# Patient Record
Sex: FEMALE | Race: WHITE | NOT HISPANIC OR LATINO | Employment: FULL TIME | URBAN - METROPOLITAN AREA
[De-identification: names, ages, dates, MRNs, and addresses within clinical notes are randomized per-mention and may not be internally consistent; named-entity substitution may affect disease eponyms.]

---
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
## 2022-03-30 ENCOUNTER — HISTORICAL (OUTPATIENT)
Dept: ADMINISTRATIVE | Facility: HOSPITAL | Age: 22
End: 2022-03-30

## 2022-03-30 ENCOUNTER — HISTORICAL (OUTPATIENT)
Dept: RADIOLOGY | Facility: HOSPITAL | Age: 22
End: 2022-03-30

## 2022-09-30 DIAGNOSIS — M79.641 RIGHT HAND PAIN: Primary | ICD-10-CM

## 2022-10-10 ENCOUNTER — OFFICE VISIT (OUTPATIENT)
Dept: ORTHOPEDICS | Facility: CLINIC | Age: 22
End: 2022-10-10
Payer: MEDICAID

## 2022-10-10 ENCOUNTER — HOSPITAL ENCOUNTER (OUTPATIENT)
Dept: RADIOLOGY | Facility: HOSPITAL | Age: 22
Discharge: HOME | End: 2022-10-10
Attending: STUDENT IN AN ORGANIZED HEALTH CARE EDUCATION/TRAINING PROGRAM
Payer: MEDICAID

## 2022-10-10 VITALS
SYSTOLIC BLOOD PRESSURE: 103 MMHG | HEART RATE: 85 BPM | WEIGHT: 95 LBS | RESPIRATION RATE: 16 BRPM | BODY MASS INDEX: 15.83 KG/M2 | HEIGHT: 65 IN | DIASTOLIC BLOOD PRESSURE: 67 MMHG

## 2022-10-10 DIAGNOSIS — M79.641 RIGHT HAND PAIN: ICD-10-CM

## 2022-10-10 DIAGNOSIS — S62.308A CLOSED DISPLACED FRACTURE OF FIFTH METACARPAL BONE, UNSPECIFIED FRACTURE MORPHOLOGY, INITIAL ENCOUNTER: Primary | ICD-10-CM

## 2022-10-10 PROCEDURE — 26600 TREAT METACARPAL FRACTURE: CPT | Mod: PBBFAC | Performed by: STUDENT IN AN ORGANIZED HEALTH CARE EDUCATION/TRAINING PROGRAM

## 2022-10-10 PROCEDURE — 73130 X-RAY EXAM OF HAND: CPT | Mod: TC,RT

## 2022-10-10 PROCEDURE — 99213 OFFICE O/P EST LOW 20 MIN: CPT | Mod: PBBFAC

## 2022-10-10 RX ORDER — NORETHINDRONE ACETATE AND ETHINYL ESTRADIOL AND FERROUS FUMARATE 1MG-20(24)
1 KIT ORAL DAILY
COMMUNITY
Start: 2022-08-15 | End: 2023-05-18

## 2022-10-10 NOTE — PROGRESS NOTES
"Subjective:    Patient ID: Peggy Noyola is a right handed 21 y.o. female  who presented to [facility] for new visit..    Chief Complaint: Pain of the Right Hand    History of Present Illness:  Peggy Noyola who has no formally previously diagnosed musculoskeletal condition presented today with Fracture of base of 5th metacarpal  involving the right hand for the past 2 weeks wt DOI 9/24/2022. Patient states that she punched a dresser 3x earlier that day. Immediately had pain / swelling / hematoma. Was seen in urgent care and was told she has a fx and gven a wrist splnt.. Pain is located over the 5th metacarpal. Quality of pain is described as aching. Pain is currently a 3/10 and well controlled with OOTC pain relievers. Treatment to date: oral analgesics.  Occupation includes:  at [employer].     Hand Review of Systems:  Swelling?  no  Instability?  no  Clicking?  no  Limited ROM? no  Fever/Chills? no  Subluxation? no  Dislocation? no  Numbness/Tingling? no  Weakness? no    Current Choice of Exercise:  none       Objective:      Physical Exam:    /67   Pulse 85   Resp 16   Ht 5' 5" (1.651 m)   Wt 43.1 kg (95 lb)   BMI 15.81 kg/m²     Appearance:  Soft tissue swelling: Left: no Right: no  Effusion: Left:  Negative Right: Negative  Erythema: Left no Right: no  Ecchymosis: Left: no Right: no  Atrophy: Left: no Right: no  No appreciable deformity    Palpation:  Hand/wrist Tenderness: Left: none  Right: over the whole 5th metacarpal     Range of motion:  Able to make a power fist and claw hand: Yes on Both hand(s); No rotational displacement of the pinky noted without any scissoring of the fingers observed on the affected hand  Distal palmar crease-finger tip distance: 0 on Both hand(s)    AIN/PIN/Radial nerve: Intact and symmetric    General appearance: NAD  Peripheral pulses: normal bilaterally   Reflexes: Left: Not performed Right: Not " performed   Sensation: normal    Labs:  Last A1c: The patient doesn't have any registry metric data available     Imaging:   Previous images reviewed.  X-rays ordered and performed today: yes  # of views: 3 Laterality: right  My Interpretation:  Distal Radial ulnar joint space is overall Normal on AP views. Scapholunate interval distance is Normal on right AP views. A DISI/VISI is not suggested on right hand series. Negative/positive ulnar variance is not suggested on lright lateral views.  fracture of the proximal  5th metacarpal without intraarticular involvement       Assessment:        Encounter Diagnoses   Name Primary?    Closed displaced fracture of fifth metacarpal bone, unspecified fracture morphology, initial encounter Yes    Right hand pain         Plan:     Orders Placed This Encounter   Procedures    X-Ray Hand Complete Right     Standing Status:   Future     Number of Occurrences:   1     Standing Expiration Date:   10/10/2023     Order Specific Question:   May the Radiologist modify the order per protocol to meet the clinical needs of the patient?     Answer:   Yes     Order Specific Question:   Release to patient     Answer:   Immediate     Dx:  2 weeks and 2 days s/p Fracture of the proximal right 5th metacarpal with DOI 9/24/2022  Acute in moderate exacerbation.   Treatment Plan: Discussed with patient diagnosis and treatment recommendations.   Natural history and expected course discussed. Questions answered.  Rest, ice, compression, and elevation (RICE) therapy.  Plain film x-rays  Does not want surgery. Short arm cast placed on patient today  Follow up in 2 weeks to reevaluate, possible removal of cast, repeat x-rays in cast, and start OT for ROM exercises  Over the counter NSAID and/or tylenol provided you do not have contraindications such as but not limited to liver or kidney disease or uncontrolled blood pressure. If you're doctors have told you to to not take them based on your health, do  not take them.   Imaging: radiological studies ordered and independently reviewed; discussed with patient; pending radiologist interpretation.   Activity: NWB to RUE  Therapy: No formal therapy at this time  Medication: first line treatment with daily acetaminophen. Up to 1000 mg three times daily can be taken; medication precautions given.. Please see your primary care physician for further refills.  RTC: 2 weeks. Repeat X-rays in cast.         [employer] 10/10/2022

## 2023-05-18 ENCOUNTER — OFFICE VISIT (OUTPATIENT)
Dept: OBSTETRICS AND GYNECOLOGY | Facility: CLINIC | Age: 23
End: 2023-05-18
Payer: MEDICAID

## 2023-05-18 ENCOUNTER — CLINICAL SUPPORT (OUTPATIENT)
Dept: RESPIRATORY THERAPY | Facility: HOSPITAL | Age: 23
End: 2023-05-18
Attending: NURSE PRACTITIONER
Payer: MEDICAID

## 2023-05-18 ENCOUNTER — LAB VISIT (OUTPATIENT)
Dept: LAB | Facility: HOSPITAL | Age: 23
End: 2023-05-18
Attending: NURSE PRACTITIONER
Payer: MEDICAID

## 2023-05-18 VITALS
WEIGHT: 104 LBS | HEIGHT: 65 IN | DIASTOLIC BLOOD PRESSURE: 60 MMHG | SYSTOLIC BLOOD PRESSURE: 92 MMHG | BODY MASS INDEX: 17.33 KG/M2

## 2023-05-18 DIAGNOSIS — Z79.899 ENCOUNTER FOR LONG-TERM (CURRENT) USE OF OTHER MEDICATIONS: ICD-10-CM

## 2023-05-18 DIAGNOSIS — Z01.411 ABNORMAL GYNECOLOGICAL EXAMINATION: Primary | ICD-10-CM

## 2023-05-18 DIAGNOSIS — Z30.41 ORAL CONTRACEPTIVE USE: ICD-10-CM

## 2023-05-18 DIAGNOSIS — Z79.899 ENCOUNTER FOR LONG-TERM (CURRENT) USE OF OTHER MEDICATIONS: Primary | ICD-10-CM

## 2023-05-18 DIAGNOSIS — Z11.3 SCREENING EXAMINATION FOR VENEREAL DISEASE: ICD-10-CM

## 2023-05-18 DIAGNOSIS — F32.89 OTHER DEPRESSION: ICD-10-CM

## 2023-05-18 LAB
ALBUMIN SERPL-MCNC: 4.5 G/DL (ref 3.5–5)
ALBUMIN/GLOB SERPL: 1.5 RATIO (ref 1.1–2)
ALP SERPL-CCNC: 72 UNIT/L (ref 40–150)
ALT SERPL-CCNC: 36 UNIT/L (ref 0–55)
AST SERPL-CCNC: 28 UNIT/L (ref 5–34)
BASOPHILS # BLD AUTO: 0.02 X10(3)/MCL
BASOPHILS NFR BLD AUTO: 0.3 %
BILIRUBIN DIRECT+TOT PNL SERPL-MCNC: 0.6 MG/DL
BUN SERPL-MCNC: 12 MG/DL (ref 7–18.7)
CALCIUM SERPL-MCNC: 9.7 MG/DL (ref 8.4–10.2)
CHLORIDE SERPL-SCNC: 106 MMOL/L (ref 98–107)
CHOLEST SERPL-MCNC: 210 MG/DL
CHOLEST/HDLC SERPL: 4 {RATIO} (ref 0–5)
CO2 SERPL-SCNC: 24 MMOL/L (ref 22–29)
CREAT SERPL-MCNC: 0.93 MG/DL (ref 0.55–1.02)
EOSINOPHIL # BLD AUTO: 0.05 X10(3)/MCL (ref 0–0.9)
EOSINOPHIL NFR BLD AUTO: 0.8 %
ERYTHROCYTE [DISTWIDTH] IN BLOOD BY AUTOMATED COUNT: 12.4 % (ref 11.5–17)
GFR SERPLBLD CREATININE-BSD FMLA CKD-EPI: >60 MLS/MIN/1.73/M2
GLOBULIN SER-MCNC: 3.1 GM/DL (ref 2.4–3.5)
GLUCOSE SERPL-MCNC: 91 MG/DL (ref 74–100)
HCT VFR BLD AUTO: 50.7 % (ref 37–47)
HDLC SERPL-MCNC: 59 MG/DL (ref 35–60)
HGB BLD-MCNC: 16.7 G/DL (ref 12–16)
IMM GRANULOCYTES # BLD AUTO: 0.02 X10(3)/MCL (ref 0–0.04)
IMM GRANULOCYTES NFR BLD AUTO: 0.3 %
LDLC SERPL CALC-MCNC: 137 MG/DL (ref 50–140)
LYMPHOCYTES # BLD AUTO: 1.51 X10(3)/MCL (ref 0.6–4.6)
LYMPHOCYTES NFR BLD AUTO: 23.3 %
MCH RBC QN AUTO: 30.2 PG (ref 27–31)
MCHC RBC AUTO-ENTMCNC: 32.9 G/DL (ref 33–36)
MCV RBC AUTO: 91.7 FL (ref 80–94)
MONOCYTES # BLD AUTO: 0.36 X10(3)/MCL (ref 0.1–1.3)
MONOCYTES NFR BLD AUTO: 5.5 %
NEUTROPHILS # BLD AUTO: 4.53 X10(3)/MCL (ref 2.1–9.2)
NEUTROPHILS NFR BLD AUTO: 69.8 %
PLATELET # BLD AUTO: 420 X10(3)/MCL (ref 130–400)
PMV BLD AUTO: 9.7 FL (ref 7.4–10.4)
POTASSIUM SERPL-SCNC: 4.1 MMOL/L (ref 3.5–5.1)
PROLACTIN LEVEL (OHS): 7.76 NG/ML (ref 5.18–26.53)
PROT SERPL-MCNC: 7.6 GM/DL (ref 6.4–8.3)
RBC # BLD AUTO: 5.53 X10(6)/MCL (ref 4.2–5.4)
SODIUM SERPL-SCNC: 140 MMOL/L (ref 136–145)
TRIGL SERPL-MCNC: 72 MG/DL (ref 37–140)
TSH SERPL-ACNC: 0.6 UIU/ML (ref 0.35–4.94)
VLDLC SERPL CALC-MCNC: 14 MG/DL
WBC # SPEC AUTO: 6.49 X10(3)/MCL (ref 4.5–11.5)

## 2023-05-18 PROCEDURE — 3074F SYST BP LT 130 MM HG: CPT | Mod: CPTII,,, | Performed by: NURSE PRACTITIONER

## 2023-05-18 PROCEDURE — 93005 ELECTROCARDIOGRAM TRACING: CPT

## 2023-05-18 PROCEDURE — 85025 COMPLETE CBC W/AUTO DIFF WBC: CPT

## 2023-05-18 PROCEDURE — 84443 ASSAY THYROID STIM HORMONE: CPT

## 2023-05-18 PROCEDURE — 80053 COMPREHEN METABOLIC PANEL: CPT

## 2023-05-18 PROCEDURE — 1160F PR REVIEW ALL MEDS BY PRESCRIBER/CLIN PHARMACIST DOCUMENTED: ICD-10-PCS | Mod: CPTII,,, | Performed by: NURSE PRACTITIONER

## 2023-05-18 PROCEDURE — 80061 LIPID PANEL: CPT

## 2023-05-18 PROCEDURE — 3074F PR MOST RECENT SYSTOLIC BLOOD PRESSURE < 130 MM HG: ICD-10-PCS | Mod: CPTII,,, | Performed by: NURSE PRACTITIONER

## 2023-05-18 PROCEDURE — 3078F PR MOST RECENT DIASTOLIC BLOOD PRESSURE < 80 MM HG: ICD-10-PCS | Mod: CPTII,,, | Performed by: NURSE PRACTITIONER

## 2023-05-18 PROCEDURE — 3008F PR BODY MASS INDEX (BMI) DOCUMENTED: ICD-10-PCS | Mod: CPTII,,, | Performed by: NURSE PRACTITIONER

## 2023-05-18 PROCEDURE — 99395 PR PREVENTIVE VISIT,EST,18-39: ICD-10-PCS | Mod: ,,, | Performed by: NURSE PRACTITIONER

## 2023-05-18 PROCEDURE — 80307 DRUG TEST PRSMV CHEM ANLYZR: CPT

## 2023-05-18 PROCEDURE — 84146 ASSAY OF PROLACTIN: CPT

## 2023-05-18 PROCEDURE — 1159F PR MEDICATION LIST DOCUMENTED IN MEDICAL RECORD: ICD-10-PCS | Mod: CPTII,,, | Performed by: NURSE PRACTITIONER

## 2023-05-18 PROCEDURE — 1159F MED LIST DOCD IN RCRD: CPT | Mod: CPTII,,, | Performed by: NURSE PRACTITIONER

## 2023-05-18 PROCEDURE — 3008F BODY MASS INDEX DOCD: CPT | Mod: CPTII,,, | Performed by: NURSE PRACTITIONER

## 2023-05-18 PROCEDURE — 99395 PREV VISIT EST AGE 18-39: CPT | Mod: ,,, | Performed by: NURSE PRACTITIONER

## 2023-05-18 PROCEDURE — 1160F RVW MEDS BY RX/DR IN RCRD: CPT | Mod: CPTII,,, | Performed by: NURSE PRACTITIONER

## 2023-05-18 PROCEDURE — 3078F DIAST BP <80 MM HG: CPT | Mod: CPTII,,, | Performed by: NURSE PRACTITIONER

## 2023-05-18 PROCEDURE — 36415 COLL VENOUS BLD VENIPUNCTURE: CPT

## 2023-05-18 RX ORDER — FLUOXETINE HCL 10 MG
10 CAPSULE ORAL EVERY MORNING
COMMUNITY
Start: 2023-05-04 | End: 2023-08-29

## 2023-05-18 RX ORDER — NORETHINDRONE ACETATE AND ETHINYL ESTRADIOL AND FERROUS FUMARATE 1MG-20(24)
1 KIT ORAL DAILY
COMMUNITY
Start: 2023-05-04 | End: 2023-05-18 | Stop reason: SDUPTHER

## 2023-05-18 RX ORDER — NORETHINDRONE ACETATE AND ETHINYL ESTRADIOL AND FERROUS FUMARATE 1MG-20(24)
1 KIT ORAL DAILY
Status: CANCELLED | OUTPATIENT
Start: 2023-05-18

## 2023-05-18 RX ORDER — NORETHINDRONE ACETATE AND ETHINYL ESTRADIOL AND FERROUS FUMARATE 1MG-20(24)
1 KIT ORAL DAILY
Qty: 28 TABLET | Refills: 11 | Status: SHIPPED | OUTPATIENT
Start: 2023-05-18 | End: 2023-07-27 | Stop reason: SDUPTHER

## 2023-05-18 NOTE — PROGRESS NOTES
Chief Complaint: Annual exam    Chief Complaint   Patient presents with    Well Woman     Currently on Junel w/ c/o despression only on her cycle. LMP 5/15/23       HPI:   22 y.o. WF  presents for an annual gyn exam.  Currently on Minastrin generic.  Complains of mild depression week of inactive pills.  Patient started Prozac 1 week ago and has not noticed a difference at this time.  Patient denies suicidal/homicidal ideations      LMP: 5/15/2023  Gardasil: none    FmHx: negative for breast, uterine, ovarian, and colon cancers.            Family History   Problem Relation Age of Onset    Lung cancer Paternal Grandmother     Breast cancer Other        Past Medical History:   Diagnosis Date    Known health problems: none     Mild intermittent asthma, uncomplicated      Past Surgical History:   Procedure Laterality Date    TONSILLECTOMY         Current Outpatient Medications:     norethindrone-e.estradioL-iron (MINASTRIN 24 FE) 1 mg-20 mcg(24) /75 mg (4) Chew, Take 1 tablet by mouth once daily., Disp: , Rfl:     PROZAC 10 mg capsule, Take 10 mg by mouth every morning., Disp: , Rfl:     JUNEL FE 24 1 mg-20 mcg (24)/75 mg (4) per tablet, Take 1 tablet by mouth once daily., Disp: , Rfl:     Review of patient's allergies indicates:   Allergen Reactions    Cat dander Itching       Social History     Tobacco Use    Smoking status: Never     Passive exposure: Never    Smokeless tobacco: Never   Substance Use Topics    Alcohol use: Yes     Comment: twice monthly    Drug use: Never       Review of Systems   Constitutional:  Negative for appetite change, chills, fatigue, fever and unexpected weight change.   Gastrointestinal:  Negative for abdominal pain, blood in stool, constipation, diarrhea, nausea, vomiting and reflux.   Genitourinary:  Negative for bladder incontinence, decreased libido, dysmenorrhea, dyspareunia, dysuria, flank pain, frequency, genital sores, hematuria, hot flashes, menorrhagia, menstrual problem,  "pelvic pain, urgency, vaginal bleeding, vaginal discharge, vaginal pain, urinary incontinence, postcoital bleeding, postmenopausal bleeding, vaginal dryness and vaginal odor.   Integumentary:  Negative for rash, acne, hair changes and mole/lesion.   Neurological:  Negative for headaches.      Physical Exam:   Vitals:    05/18/23 0843   BP: 92/60   BP Location: Right arm   Patient Position: Sitting   BP Method: Medium (Manual)   Weight: 47.2 kg (104 lb)   Height: 5' 5" (1.651 m)       Body mass index is 17.31 kg/m².    Physical Exam  Constitutional:       Appearance: She is well-developed.   Neck:      Thyroid: No thyroid mass or thyroid tenderness.   Cardiovascular:      Rate and Rhythm: Normal rate and regular rhythm.      Pulses: Normal pulses.      Heart sounds: Normal heart sounds. No murmur heard.  Pulmonary:      Effort: No respiratory distress.      Breath sounds: Normal breath sounds. No decreased breath sounds, wheezing, rhonchi or rales.   Abdominal:      General: Bowel sounds are normal.      Palpations: There is no mass.      Tenderness: There is no abdominal tenderness.      Hernia: No hernia is present.   Musculoskeletal:      Cervical back: No edema.      Right lower leg: No edema.      Left lower leg: No edema.   Lymphadenopathy:      Head:      Right side of head: No submandibular or preauricular adenopathy.      Left side of head: No submandibular or preauricular adenopathy.      Upper Body:      Right upper body: No supraclavicular or axillary adenopathy.      Left upper body: No supraclavicular or axillary adenopathy.   Skin:     General: Skin is warm and dry.      Coloration: Skin is not pale.      Findings: No erythema or rash.   Neurological:      Mental Status: She is alert and oriented to person, place, and time.   Psychiatric:         Mood and Affect: Mood normal. Mood is not anxious or depressed.         Behavior: Behavior normal.         Thought Content: Thought content normal. Thought " content does not include homicidal or suicidal ideation. Thought content does not include homicidal or suicidal plan.         Assessment:     There is no problem list on file for this patient.      Health Maintenance Due   Topic Date Due    Hepatitis C Screening  Never done    Lipid Panel  Never done    COVID-19 Vaccine (1) Never done    HIV Screening  Never done    Chlamydia Screening  Never done    TETANUS VACCINE  Never done     Health Maintenance Topics with due status: Not Due       Topic Last Completion Date    Influenza Vaccine 11/12/2019         Plan:    Peggy Post was seen today for well woman.    Diagnoses and all orders for this visit:    Screening examination for venereal disease  -     MDL Sendout Test    Abnormal gynecological examination  No PAP  Uro Swab GC/CZ/TV  Counseled regarding safe sex practices and prevention of STD's  Discussed contraceptive options.  Discussed HPV vaccine  Advised avoidance of tobacco, alcohol, and illicit drug use  Seat belt  RTC 1 yr   Other depression  Encouraged to continue Prozac can follow-up with prescriber p.r.n.  Advised if suicidal/homicidal ideations to go to ER immediately    Oral contraceptive use  Renew Minastrin

## 2023-05-19 LAB
AMPHET UR QL SCN: NEGATIVE
BARBITURATE SCN PRESENT UR: NEGATIVE
BENZODIAZ UR QL SCN: NEGATIVE
CANNABINOIDS UR QL SCN: POSITIVE
COCAINE UR QL SCN: NEGATIVE
FENTANYL UR QL SCN: NEGATIVE
MDMA UR QL SCN: NEGATIVE
OPIATES UR QL SCN: NEGATIVE
PCP UR QL: NEGATIVE
PH UR: 6 [PH] (ref 3–11)
SPECIFIC GRAVITY, URINE AUTO (.000) (OHS): 1.02 (ref 1–1.03)

## 2023-05-20 LAB — MAYO GENERIC ORDERABLE RESULT: NORMAL

## 2023-07-27 RX ORDER — NORETHINDRONE ACETATE AND ETHINYL ESTRADIOL AND FERROUS FUMARATE 1MG-20(24)
1 KIT ORAL DAILY
Qty: 28 TABLET | Refills: 11 | Status: SHIPPED | OUTPATIENT
Start: 2023-07-27 | End: 2023-08-29

## 2023-08-29 ENCOUNTER — OFFICE VISIT (OUTPATIENT)
Dept: OBSTETRICS AND GYNECOLOGY | Facility: CLINIC | Age: 23
End: 2023-08-29
Payer: MEDICAID

## 2023-08-29 VITALS
BODY MASS INDEX: 16.79 KG/M2 | WEIGHT: 100.75 LBS | DIASTOLIC BLOOD PRESSURE: 70 MMHG | SYSTOLIC BLOOD PRESSURE: 118 MMHG | HEIGHT: 65 IN

## 2023-08-29 DIAGNOSIS — N76.0 BV (BACTERIAL VAGINOSIS): ICD-10-CM

## 2023-08-29 DIAGNOSIS — N94.6 DYSMENORRHEA: ICD-10-CM

## 2023-08-29 DIAGNOSIS — B96.89 BV (BACTERIAL VAGINOSIS): ICD-10-CM

## 2023-08-29 DIAGNOSIS — N92.6 MISSED MENSES: ICD-10-CM

## 2023-08-29 DIAGNOSIS — Z31.9 PATIENT DESIRES PREGNANCY: ICD-10-CM

## 2023-08-29 DIAGNOSIS — N89.8 VAGINAL DISCHARGE: Primary | ICD-10-CM

## 2023-08-29 LAB
B-HCG UR QL: NEGATIVE
BACTERIA HYPHAE, POC: NEGATIVE
CTP QC/QA: YES
GARDNERELLA VAGINALIS: ABNORMAL
OTHER MICROSC. OBSERVATIONS: ABNORMAL
POC BACTERIAL VAGINOSIS: POSITIVE
POC CLUE CELLS: POSITIVE
TRICHOMONAS, POC: NEGATIVE
YEAST WET PREP: NEGATIVE
YEAST, POC: NEGATIVE

## 2023-08-29 PROCEDURE — 1159F PR MEDICATION LIST DOCUMENTED IN MEDICAL RECORD: ICD-10-PCS | Mod: CPTII,,, | Performed by: NURSE PRACTITIONER

## 2023-08-29 PROCEDURE — 87220 POCT KOH: ICD-10-PCS | Mod: QW,,, | Performed by: NURSE PRACTITIONER

## 2023-08-29 PROCEDURE — 3074F SYST BP LT 130 MM HG: CPT | Mod: CPTII,,, | Performed by: NURSE PRACTITIONER

## 2023-08-29 PROCEDURE — 87220 TISSUE EXAM FOR FUNGI: CPT | Mod: QW,,, | Performed by: NURSE PRACTITIONER

## 2023-08-29 PROCEDURE — 99213 OFFICE O/P EST LOW 20 MIN: CPT | Mod: ,,, | Performed by: NURSE PRACTITIONER

## 2023-08-29 PROCEDURE — 81025 POCT URINE PREGNANCY: ICD-10-PCS | Mod: ,,, | Performed by: NURSE PRACTITIONER

## 2023-08-29 PROCEDURE — 81025 URINE PREGNANCY TEST: CPT | Mod: ,,, | Performed by: NURSE PRACTITIONER

## 2023-08-29 PROCEDURE — 87210 POCT WET PREP: ICD-10-PCS | Mod: QW,,, | Performed by: NURSE PRACTITIONER

## 2023-08-29 PROCEDURE — 87591 N.GONORRHOEAE DNA AMP PROB: CPT | Performed by: NURSE PRACTITIONER

## 2023-08-29 PROCEDURE — 99213 PR OFFICE/OUTPT VISIT, EST, LEVL III, 20-29 MIN: ICD-10-PCS | Mod: ,,, | Performed by: NURSE PRACTITIONER

## 2023-08-29 PROCEDURE — 3008F PR BODY MASS INDEX (BMI) DOCUMENTED: ICD-10-PCS | Mod: CPTII,,, | Performed by: NURSE PRACTITIONER

## 2023-08-29 PROCEDURE — 87210 SMEAR WET MOUNT SALINE/INK: CPT | Mod: QW,,, | Performed by: NURSE PRACTITIONER

## 2023-08-29 PROCEDURE — 87491 CHLMYD TRACH DNA AMP PROBE: CPT | Performed by: NURSE PRACTITIONER

## 2023-08-29 PROCEDURE — 3008F BODY MASS INDEX DOCD: CPT | Mod: CPTII,,, | Performed by: NURSE PRACTITIONER

## 2023-08-29 PROCEDURE — 1160F PR REVIEW ALL MEDS BY PRESCRIBER/CLIN PHARMACIST DOCUMENTED: ICD-10-PCS | Mod: CPTII,,, | Performed by: NURSE PRACTITIONER

## 2023-08-29 PROCEDURE — 87661 TRICHOMONAS VAGINALIS AMPLIF: CPT | Performed by: NURSE PRACTITIONER

## 2023-08-29 PROCEDURE — 3078F DIAST BP <80 MM HG: CPT | Mod: CPTII,,, | Performed by: NURSE PRACTITIONER

## 2023-08-29 PROCEDURE — 1160F RVW MEDS BY RX/DR IN RCRD: CPT | Mod: CPTII,,, | Performed by: NURSE PRACTITIONER

## 2023-08-29 PROCEDURE — 3074F PR MOST RECENT SYSTOLIC BLOOD PRESSURE < 130 MM HG: ICD-10-PCS | Mod: CPTII,,, | Performed by: NURSE PRACTITIONER

## 2023-08-29 PROCEDURE — 3078F PR MOST RECENT DIASTOLIC BLOOD PRESSURE < 80 MM HG: ICD-10-PCS | Mod: CPTII,,, | Performed by: NURSE PRACTITIONER

## 2023-08-29 PROCEDURE — 1159F MED LIST DOCD IN RCRD: CPT | Mod: CPTII,,, | Performed by: NURSE PRACTITIONER

## 2023-08-29 RX ORDER — FLUOXETINE HYDROCHLORIDE 40 MG/1
40 CAPSULE ORAL DAILY
COMMUNITY

## 2023-08-29 RX ORDER — METRONIDAZOLE 500 MG/1
500 TABLET ORAL EVERY 12 HOURS
Qty: 14 TABLET | Refills: 0 | Status: SHIPPED | OUTPATIENT
Start: 2023-08-29 | End: 2023-09-05

## 2023-08-29 RX ORDER — MEDROXYPROGESTERONE ACETATE 10 MG/1
TABLET ORAL
Qty: 10 TABLET | Refills: 2 | Status: SHIPPED | OUTPATIENT
Start: 2023-08-29

## 2023-08-29 NOTE — PROGRESS NOTES
Chief Complaint:     Chief Complaint   Patient presents with    irregular cycle     No cycle since 23, No form of birthcontrol. C/O white D/C with cramping.   Currently seeking pregnancy          HPI:   22 y.o.  female   presents with c/o white vaginal discharge and cramping x 2 weeks.  Denies dysuria, vaginal itching, fever.    Desires pregnancy.  LMP 23.  OPCs d/c'd early July.  No bleeding since then.       LMP: 23  Frequency: irregular   Cycle Length: 7 days   Flow: moderate  Intermenstrual Bleeding: No  Postcoital Bleeding: No  Dysmenorrhea: No  Sexually Active: yes   Dyspareunia: No  Contraception: NONE , Seeking pregnancy   H/o STI: No   Last pap:under 21  H/o abnl pap: No   Colposcopy: no  G      Current Outpatient Medications:     FLUoxetine 40 MG capsule, Take 40 mg by mouth once daily., Disp: , Rfl:     medroxyPROGESTERone (PROVERA) 10 MG tablet, Take 1 tablet daily for 10 days q month, Disp: 10 tablet, Rfl: 2    metroNIDAZOLE (FLAGYL) 500 MG tablet, Take 1 tablet (500 mg total) by mouth every 12 (twelve) hours. for 7 days, Disp: 14 tablet, Rfl: 0    Review of patient's allergies indicates:   Allergen Reactions    Cat dander Itching       Social History     Tobacco Use    Smoking status: Never     Passive exposure: Never    Smokeless tobacco: Never   Substance Use Topics    Alcohol use: Yes     Comment: twice monthly    Drug use: Never       Review of Systems:   General/Constitutional: Chills denies. Fatigue/weakness denies. Fever denies. Night sweats denies. Hot flashes denies    Respiratory: Cough denies. Hemoptysis denies. SOB denies. Sputum production denies. Wheezing denies .   Cardiovascular: Chest pain denies . Dizziness denies. Palpitations denies. Swelling in hands/feet denies    Gastrointestinal: Abdominal pain denies. Blood in stool denies. Constipation denies. Diarrhea denies. Heartburn denies. Nausea denies. Vomiting denies    Genitourinary: Incontinence denies. Blood  "in urine denies. Frequent urination denies. Painful urination denies. Urinary urgency denies. Nocturia denies    Gynecologic: Irregular menses denies. Heavy bleeding denies. Painful menses denies. Vaginal discharge admits Vaginal odor denies. Vaginal itching denies. Vaginal lesion denies. Pelvic pain admits. Decreased libido denies. Vulvar lesion denies. Prolapse of genital organs denies. Painful intercourse denies. Postcoital bleeding denies    Psychiatric: Depression denies. Anxiety denies       Physical Exam:   Vitals:    08/29/23 0954   BP: 118/70   Weight: 45.7 kg (100 lb 12 oz)   Height: 5' 5" (1.651 m)       Body mass index is 16.77 kg/m².    Physical Exam  Constitutional:       Appearance: She is well-developed.   Abdominal:      General: Abdomen is flat. Bowel sounds are normal. There is no distension.      Palpations: Abdomen is soft. There is no mass.      Tenderness: There is no abdominal tenderness.      Hernia: No hernia is present.   Genitourinary:     Vagina: Vaginal discharge present. No erythema, tenderness or bleeding.      Cervix: No cervical motion tenderness or discharge.      Uterus: Not enlarged and not tender.       Adnexa:         Right: No mass, tenderness or fullness.          Left: No mass, tenderness or fullness.     Neurological:      Mental Status: She is alert and oriented to person, place, and time.   Psychiatric:         Attention and Perception: Attention normal.         Mood and Affect: Mood normal.             Assessment:     There is no problem list on file for this patient.      Health Maintenance Due   Topic Date Due    Hepatitis C Screening  Never done    COVID-19 Vaccine (1) Never done    HIV Screening  Never done    Chlamydia Screening  Never done     Health Maintenance Topics with due status: Not Due       Topic Last Completion Date    TETANUS VACCINE 04/11/2014    Influenza Vaccine 11/12/2019           Plan:    Peggy was seen today for irregular cycle.    Diagnoses and " all orders for this visit:    Missed menses  UPT negative  Provera q hs x 10  -     POCT Urine Pregnancy    Vaginal discharge  leuk/myco/urea  -     POCT KOH  -     POCT Wet Prep    Dysmenorrhea  - Educated    - NSAIDs, heating pad, warm bath    - Pain precautions    BV (bacterial vaginosis)  Flagyl bid x 7 days  - AVOID: Scented soaps or shampoos, Bubble bath, Scented detergens, Feminine sprays, douches, powders    - Fragrance-free pH neutral soap    - Unscented detergents   Other orders  -     medroxyPROGESTERone (PROVERA) 10 MG tablet; Take 1 tablet daily for 10 days q month  -     metroNIDAZOLE (FLAGYL) 500 MG tablet; Take 1 tablet (500 mg total) by mouth every 12 (twelve) hours. for 7 days    Desires pregnancy   PNV daily

## 2023-09-01 LAB
C TRACH RRNA SPEC QL NAA+PROBE: NEGATIVE
N GONORRHOEA RRNA SPEC QL NAA+PROBE: NEGATIVE
SPECIMEN SOURCE: NORMAL
T VAGINALIS RRNA SPEC QL NAA+PROBE: NEGATIVE

## 2023-09-08 ENCOUNTER — TELEPHONE (OUTPATIENT)
Dept: OBSTETRICS AND GYNECOLOGY | Facility: CLINIC | Age: 23
End: 2023-09-08
Payer: MEDICAID

## 2023-09-08 DIAGNOSIS — N73.9 PELVIC INFLAMMATORY DISEASE DUE TO MYCOPLASMA HOMINIS: Primary | ICD-10-CM

## 2023-09-08 DIAGNOSIS — Z22.39 CARRIER OF UREAPLASMA UREALYTICUM: ICD-10-CM

## 2023-09-08 DIAGNOSIS — B96.89 PELVIC INFLAMMATORY DISEASE DUE TO MYCOPLASMA HOMINIS: Primary | ICD-10-CM

## 2023-09-08 RX ORDER — DOXYCYCLINE 100 MG/1
100 CAPSULE ORAL 2 TIMES DAILY
Qty: 14 CAPSULE | Refills: 0 | Status: SHIPPED | OUTPATIENT
Start: 2023-09-08 | End: 2023-09-15

## 2023-09-08 RX ORDER — MOXIFLOXACIN HYDROCHLORIDE 400 MG/1
400 TABLET ORAL DAILY
Qty: 7 TABLET | Refills: 0 | Status: SHIPPED | OUTPATIENT
Start: 2023-09-08 | End: 2023-09-15

## 2023-09-08 NOTE — TELEPHONE ENCOUNTER
Spoke with patient, educated of results +MH & Ureaplasma. Per SL treat per protocol. Medication sent to the pharmacy. Patient has Annual scheduled 10/19/23, will perform SENTHIL at this appointment. Verbalized understanding. Educated patient Doxy can not be taken if pregnant, use protection if sexually active to prevent pregnancy while taking. Verbalized understanding.

## 2023-10-19 ENCOUNTER — OFFICE VISIT (OUTPATIENT)
Dept: OBSTETRICS AND GYNECOLOGY | Facility: CLINIC | Age: 23
End: 2023-10-19
Payer: MEDICAID

## 2023-10-19 VITALS
SYSTOLIC BLOOD PRESSURE: 102 MMHG | BODY MASS INDEX: 17.49 KG/M2 | DIASTOLIC BLOOD PRESSURE: 60 MMHG | WEIGHT: 105 LBS | HEIGHT: 65 IN

## 2023-10-19 DIAGNOSIS — R30.0 BURNING WITH URINATION: Primary | ICD-10-CM

## 2023-10-19 LAB
BILIRUB UR QL STRIP: NEGATIVE
GLUCOSE UR QL STRIP: NEGATIVE
KETONES UR QL STRIP: NEGATIVE
LEUKOCYTE ESTERASE UR QL STRIP: POSITIVE
PH, POC UA: 5.5
POC BLOOD, URINE: POSITIVE
POC NITRATES, URINE: NEGATIVE
PROT UR QL STRIP: POSITIVE
SP GR UR STRIP: >=1.03 (ref 1–1.03)
UROBILINOGEN UR STRIP-ACNC: 0.2 (ref 0.1–1.1)

## 2023-10-19 PROCEDURE — 3078F PR MOST RECENT DIASTOLIC BLOOD PRESSURE < 80 MM HG: ICD-10-PCS | Mod: CPTII,,, | Performed by: NURSE PRACTITIONER

## 2023-10-19 PROCEDURE — 3074F PR MOST RECENT SYSTOLIC BLOOD PRESSURE < 130 MM HG: ICD-10-PCS | Mod: CPTII,,, | Performed by: NURSE PRACTITIONER

## 2023-10-19 PROCEDURE — 99213 OFFICE O/P EST LOW 20 MIN: CPT | Mod: ,,, | Performed by: NURSE PRACTITIONER

## 2023-10-19 PROCEDURE — 81003 URINALYSIS AUTO W/O SCOPE: CPT | Mod: QW,,, | Performed by: NURSE PRACTITIONER

## 2023-10-19 PROCEDURE — 3008F BODY MASS INDEX DOCD: CPT | Mod: CPTII,,, | Performed by: NURSE PRACTITIONER

## 2023-10-19 PROCEDURE — 1160F PR REVIEW ALL MEDS BY PRESCRIBER/CLIN PHARMACIST DOCUMENTED: ICD-10-PCS | Mod: CPTII,,, | Performed by: NURSE PRACTITIONER

## 2023-10-19 PROCEDURE — 81003 POCT URINALYSIS, DIPSTICK, AUTOMATED, W/O SCOPE: ICD-10-PCS | Mod: QW,,, | Performed by: NURSE PRACTITIONER

## 2023-10-19 PROCEDURE — 3074F SYST BP LT 130 MM HG: CPT | Mod: CPTII,,, | Performed by: NURSE PRACTITIONER

## 2023-10-19 PROCEDURE — 99213 PR OFFICE/OUTPT VISIT, EST, LEVL III, 20-29 MIN: ICD-10-PCS | Mod: ,,, | Performed by: NURSE PRACTITIONER

## 2023-10-19 PROCEDURE — 1159F PR MEDICATION LIST DOCUMENTED IN MEDICAL RECORD: ICD-10-PCS | Mod: CPTII,,, | Performed by: NURSE PRACTITIONER

## 2023-10-19 PROCEDURE — 1159F MED LIST DOCD IN RCRD: CPT | Mod: CPTII,,, | Performed by: NURSE PRACTITIONER

## 2023-10-19 PROCEDURE — 3078F DIAST BP <80 MM HG: CPT | Mod: CPTII,,, | Performed by: NURSE PRACTITIONER

## 2023-10-19 PROCEDURE — 3008F PR BODY MASS INDEX (BMI) DOCUMENTED: ICD-10-PCS | Mod: CPTII,,, | Performed by: NURSE PRACTITIONER

## 2023-10-19 PROCEDURE — 1160F RVW MEDS BY RX/DR IN RCRD: CPT | Mod: CPTII,,, | Performed by: NURSE PRACTITIONER

## 2023-10-19 RX ORDER — CARIPRAZINE 1.5 MG/1
1.5 CAPSULE, GELATIN COATED ORAL
COMMUNITY
Start: 2023-10-11

## 2023-10-19 NOTE — PROGRESS NOTES
"    Chief Complaint     Follow-up and Dysuria    HPI:     Patient is a 23 y.o.  presents today for follow up. Reports took medication as directed for BV. Denies vaginal discharge, odor. C/o dysuria x3 days. States, "I have been using scented pads and every time I use them I have burning with urination".    LMP: 10/16/2023  Frequency: monthly   Cycle Length: 7 days   Flow: moderate  Intermenstrual Bleeding: No  Postcoital Bleeding: No  Dysmenorrhea: No  Sexually Active: yes   Dyspareunia: No  Contraception: NONE , Seeking pregnancy   H/o STI: No   Last pap: none  H/o abnl pap: No   Colposcopy: no  Gardasil: 3/3   MMG: NA  H/o abnl MMG: NA  Colonoscopy: Never      Past Medical History:   Diagnosis Date    Known health problems: none     Mild intermittent asthma, uncomplicated        Past Surgical History:   Procedure Laterality Date    TONSILLECTOMY         Family History   Problem Relation Age of Onset    Lung cancer Paternal Grandmother     Breast cancer Other     Uterine cancer Neg Hx     Cervical cancer Neg Hx     Ovarian cancer Neg Hx     Colon cancer Neg Hx        OB History          1    Para   0    Term   0       0    AB   1    Living   0         SAB   1    IAB   0    Ectopic   0    Multiple   0    Live Births   0           Obstetric Comments                    Current Outpatient Medications on File Prior to Visit   Medication Sig Dispense Refill    VRAYLAR 1.5 mg Cap Take 1.5 mg by mouth.      FLUoxetine 40 MG capsule Take 40 mg by mouth once daily.      medroxyPROGESTERone (PROVERA) 10 MG tablet Take 1 tablet daily for 10 days q month (Patient not taking: Reported on 10/19/2023) 10 tablet 2     No current facility-administered medications on file prior to visit.       Review of Systems:       Review of Systems   Constitutional:  Negative for chills and fever.   Gastrointestinal:  Negative for abdominal pain, constipation and diarrhea.   Genitourinary:  Positive for dysuria. Negative for " "bladder incontinence, decreased libido, dysmenorrhea, dyspareunia, flank pain, frequency, genital sores, hematuria, hot flashes, menorrhagia, menstrual problem, pelvic pain, urgency, vaginal bleeding, vaginal discharge, vaginal pain, urinary incontinence, postcoital bleeding, postmenopausal bleeding, vaginal dryness and vaginal odor.        Vaginal irritation         Physical Exam:    /60 (BP Location: Left arm, Patient Position: Sitting)   Ht 5' 5" (1.651 m)   Wt 47.6 kg (105 lb)   LMP 10/16/2023 (Exact Date)   BMI 17.47 kg/m²     Physical Exam   General Exam:    General Appearance: alert, in no acute distress, normal, well nourished.  Psych:  Orientation: time, place, person.  Mood/Affect: Normal         Assessment:   1. Burning with urination  -     POCT Urinalysis, Dipstick, Automated, W/O Scope  -     Urine Culture High Risk             Plan:   1. Burning with urination  - POCT Urinalysis, Dipstick, Automated, W/O Scope  - Urine Culture High Risk    UA today: +Blood, +Protein, +Leukocytes   Urine Culture     Will call with Urine culture results     RTC pending results       "

## 2024-03-21 DIAGNOSIS — R10.30 LOWER ABDOMINAL PAIN: Primary | ICD-10-CM

## 2024-04-05 ENCOUNTER — HOSPITAL ENCOUNTER (OUTPATIENT)
Dept: RADIOLOGY | Facility: HOSPITAL | Age: 24
Discharge: HOME | End: 2024-04-05
Attending: NURSE PRACTITIONER
Payer: MEDICAID

## 2024-04-05 DIAGNOSIS — R10.30 LOWER ABDOMINAL PAIN: ICD-10-CM

## 2024-04-05 PROCEDURE — 76700 US EXAM ABDOM COMPLETE: CPT | Mod: TC

## 2024-04-05 PROCEDURE — 76856 US EXAM PELVIC COMPLETE: CPT | Mod: TC

## 2024-05-22 ENCOUNTER — OFFICE VISIT (OUTPATIENT)
Dept: OBSTETRICS AND GYNECOLOGY | Facility: CLINIC | Age: 24
End: 2024-05-22
Payer: MEDICAID

## 2024-05-22 VITALS
DIASTOLIC BLOOD PRESSURE: 60 MMHG | BODY MASS INDEX: 16.93 KG/M2 | SYSTOLIC BLOOD PRESSURE: 102 MMHG | WEIGHT: 101.63 LBS | HEIGHT: 65 IN

## 2024-05-22 DIAGNOSIS — Z11.3 ROUTINE SCREENING FOR STI (SEXUALLY TRANSMITTED INFECTION): ICD-10-CM

## 2024-05-22 DIAGNOSIS — Z01.411 ABNORMAL GYNECOLOGICAL EXAMINATION: Primary | ICD-10-CM

## 2024-05-22 DIAGNOSIS — Z12.4 CERVICAL CANCER SCREENING: ICD-10-CM

## 2024-05-22 DIAGNOSIS — N94.6 DYSMENORRHEA: ICD-10-CM

## 2024-05-22 DIAGNOSIS — N94.10 DYSPAREUNIA, FEMALE: ICD-10-CM

## 2024-05-22 DIAGNOSIS — R10.2 PELVIC PAIN: ICD-10-CM

## 2024-05-22 PROCEDURE — 87661 TRICHOMONAS VAGINALIS AMPLIF: CPT | Performed by: NURSE PRACTITIONER

## 2024-05-22 PROCEDURE — 3078F DIAST BP <80 MM HG: CPT | Mod: CPTII,,, | Performed by: NURSE PRACTITIONER

## 2024-05-22 PROCEDURE — 1160F RVW MEDS BY RX/DR IN RCRD: CPT | Mod: CPTII,,, | Performed by: NURSE PRACTITIONER

## 2024-05-22 PROCEDURE — 3008F BODY MASS INDEX DOCD: CPT | Mod: CPTII,,, | Performed by: NURSE PRACTITIONER

## 2024-05-22 PROCEDURE — 99395 PREV VISIT EST AGE 18-39: CPT | Mod: ,,, | Performed by: NURSE PRACTITIONER

## 2024-05-22 PROCEDURE — 87491 CHLMYD TRACH DNA AMP PROBE: CPT | Performed by: NURSE PRACTITIONER

## 2024-05-22 PROCEDURE — 87591 N.GONORRHOEAE DNA AMP PROB: CPT | Performed by: NURSE PRACTITIONER

## 2024-05-22 PROCEDURE — 1159F MED LIST DOCD IN RCRD: CPT | Mod: CPTII,,, | Performed by: NURSE PRACTITIONER

## 2024-05-22 PROCEDURE — 3074F SYST BP LT 130 MM HG: CPT | Mod: CPTII,,, | Performed by: NURSE PRACTITIONER

## 2024-05-22 RX ORDER — ARIPIPRAZOLE 10 MG/1
TABLET ORAL
COMMUNITY
Start: 2024-04-24

## 2024-05-22 RX ORDER — SERTRALINE HYDROCHLORIDE 50 MG/1
TABLET, FILM COATED ORAL
COMMUNITY
Start: 2024-04-24

## 2024-05-22 NOTE — PROGRESS NOTES
"Chief Complaint: Annual exam    Chief Complaint   Patient presents with    Well Woman       HPI:   Peggy Noyola is a 23 y.o. year old  here for her Annual Exam. Reports concerned for endometriosis due to family hx of. Reports severe dysmenorrhea, menorrhagia, occ dyspareunia. Dysmenorrhea relieved with NSAIDS. Pt has tried OCP in past, has not tried other forms of contraception. Reports OCP improved dysmenorrhea, menorrhagia. States will have pelvic pain at random times throughout the month.     Gyn History:    Menstrual History   Cycle: Yes  Menarche Age: 16 years  Flow Duration: 5  Flow: Normal  Interval: 28  Intermenstrual Bleeding: No  Dysmenorrhea: No  [place]  Sexually Active: Yes  Sexual Orientation: Test  Postcoital Bleeding: No  Dyspareunia: Yes  STI History: Yes  STI Type: Chlamydia  Contraception: Yes  Contraception Type: Condoms  Menopause  Menopause Age: 2 years  Breast History  Last Breast Imaging Date: No  History of Breast Biopsy: No  Pap History   Last pap date:  ("age 21")  Result: Normal  History of Abnormal Pap: No  HPV Vaccine Completed: Yes    24 pelvic u/s:  FINDINGS:  The uterus measures 8.2 x 3.3 x 4.4 cm.  The endometrial stripe is 7 mm.  There are changes suspicious for a septate uterus.  The left ovary measures 3.3 x 2.3 x 3.5 cm.  There are follicles present.  There is flow present.  The right ovary measures 3.4 x 1.5 x 2.4 cm.  There are follicles present.  There is flow present  Impression:  Changes suspicious for septate uterus     US abdomen 24:  FINDINGS:  The liver is of normal size and shape no focal lesions are seen.  There is hepatopetal flow in the portal vein.  Gallbladder is not well distended however appears anechoic.  Common bile duct measures 4 mm.  The pancreas appears normal.  The proximal IVC appears normal however flow is not demonstrated.  Aorta shows no evidence of an aneurysm however the bifurcation is not demonstrated.  The right kidney " measures 9.4 by 4.1 x 4.8 cm.  There are no focal lesions noted there is no hydronephrosis.  The left kidney measures 9.6 by 3.8 x 4 cm.  There are no focal lesions noted there is no hydronephrosis  Impression:  No acute abnormalities are demonstrated       Past Medical History:   Diagnosis Date    Anxiety     Depression     Dyspareunia     Endometriosis of uterus     Hypertension     Infertility, female     Known health problems: none     Mild intermittent asthma, uncomplicated      Past Surgical History:   Procedure Laterality Date    TONSILLECTOMY         Current Outpatient Medications:     ARIPiprazole (ABILIFY) 10 MG Tab, TAKE 1 TABLET BY MOUTH DAILY EVERY MORNING, Disp: , Rfl:     sertraline (ZOLOFT) 50 MG tablet, take ONE-HALF TABLET BY MOUTH EVERY MORNING FOR 5 DAYS THEN increase TO 1 tablet every morning, Disp: , Rfl:   Review of patient's allergies indicates:   Allergen Reactions    Cat dander Itching     OB History    Para Term  AB Living   1 0 0 0 1 0   SAB IAB Ectopic Multiple Live Births   1 0 0 0 0      # Outcome Date GA Lbr Darrion/2nd Weight Sex Type Anes PTL Lv   1 SAB 20 4w0d    SAB         Obstetric Comments        Social History     Tobacco Use    Smoking status: Never     Passive exposure: Never    Smokeless tobacco: Never   Substance Use Topics    Alcohol use: Not Currently     Comment: twice monthly    Drug use: Yes     Frequency: 6.0 times per week     Types: Marijuana     Family History   Problem Relation Name Age of Onset    Lung cancer Paternal Grandmother Marie Noyola     Cancer Paternal Grandmother Marie Noyola     Breast cancer Maternal Grandmother Maw Caroline         onset unknown    Migraines Mother Rhonda Caroline     Asthma Brother Hema Noyola     Diabetes Maternal Aunt Caryn Caroline     Breast cancer Other MGGM         onset unknown    Uterine cancer Neg Hx      Cervical cancer Neg Hx      Ovarian cancer Neg Hx      Colon cancer Neg Hx         Review of  "Systems:   Review of Systems   Constitutional:  Negative for appetite change, chills, fatigue, fever and unexpected weight change.   Eyes:  Negative for visual disturbance.   Respiratory:  Negative for cough, shortness of breath and wheezing.    Cardiovascular:  Negative for chest pain, palpitations and leg swelling.   Gastrointestinal:  Negative for abdominal pain, bloating, blood in stool, constipation, diarrhea, nausea, vomiting, reflux and fecal incontinence.   Endocrine: Negative for hair loss and hot flashes.   Genitourinary:  Positive for dysmenorrhea, dyspareunia and menorrhagia. Negative for bladder incontinence, decreased libido, dysuria, flank pain, frequency, genital sores, hematuria, hot flashes, menstrual problem, pelvic pain, urgency, vaginal bleeding, vaginal discharge, vaginal pain, urinary incontinence, postcoital bleeding, postmenopausal bleeding, vaginal dryness and vaginal odor.   Integumentary:  Negative for rash, acne, hair changes, breast mass, nipple discharge, breast skin changes and breast tenderness.   Neurological:  Negative for headaches.   Psychiatric/Behavioral:  Negative for depression.    Breast: Negative for asymmetry, breast self exam, lump, mass, mastodynia, nipple discharge, skin changes and tenderness       Physical Exam:  /60 (BP Location: Left arm, Patient Position: Sitting)   Ht 5' 5" (1.651 m)   Wt 46.1 kg (101 lb 9.6 oz)   LMP 05/12/2024   BMI 16.91 kg/m²       Physical Exam:   Constitutional: She is oriented to person, place, and time. She appears well-developed and well-nourished.    HENT:   Head: Normocephalic.      Cardiovascular:       Exam reveals no edema.        Pulmonary/Chest: Effort normal. She exhibits no mass, no tenderness, no bony tenderness, no deformity and no retraction. Right breast exhibits no inverted nipple, no mass, no nipple discharge, no skin change, no tenderness, no bleeding, no swelling, no mastectomy, no augmentation and no " lumpectomy. Left breast exhibits no inverted nipple, no mass, no nipple discharge, no skin change, no tenderness, no bleeding, no swelling, no mastectomy, no augmentation and no lumpectomy. Breasts are symmetrical.        Abdominal: Soft. She exhibits no distension and no mass. There is no abdominal tenderness. There is no rebound and no guarding. No hernia. Hernia confirmed negative in the right inguinal area.     Genitourinary:    Inguinal canal, vagina, uterus, right adnexa, left adnexa and rectum normal.   Rectum:      No anal fissure or external hemorrhoid.   The external female genitalia was normal.   No external genitalia lesions identified,Genitalia hair distrobution normal .     Labial bartholins normal.There is no rash, tenderness, lesion or injury on the right labia. There is no rash, tenderness, lesion or injury on the left labia. Cervix is normal. No no masses or organomegaly. Right adnexum displays no mass, no tenderness and no fullness. Left adnexum displays no mass, no tenderness and no fullness. Vagina exhibits no lesion. No erythema, vaginal discharge, tenderness, bleeding, rectocele, cystocele or prolapse of vaginal walls in the vagina.    No foreign body in the vagina.      No signs of injury in the vagina.   Vagina was moist.Cervix exhibits no motion tenderness, no lesion, no discharge, no friability, no tenderness and no polyp. Uterus consistancy normal and Uerus contour normal  Uterus is not deviated, not enlarged, not fixed, not tender, not hosting fibroids and no uterine prolapse. Normal urethral meatus.Urethral Meatus exhibits: urethral lesionUrethra findings: no urethral mass, no tenderness and prolapsedBladder findings: no bladder tenderness          Musculoskeletal: Normal range of motion.      Lymphadenopathy: No inguinal adenopathy noted on the right or left side.    Neurological: She is alert and oriented to person, place, and time.    Skin: Skin is warm and dry.    Psychiatric: She  has a normal mood and affect. Her behavior is normal. Judgment and thought content normal.        Assessment:   Annual Well Women Exam  1. Abnormal gynecological examination    2. Pelvic pain    3. Dysmenorrhea    4. Dyspareunia, female        Plan:  Pap GC CZ TV   Breast Self-awareness  Recommend exercise at least 3 times weekly  Healthy, balanced diet  Keep yearly follow up with PCP  Follow up for discussion with Dr Fried .   Peggy was seen today for well woman.    Diagnoses and all orders for this visit:    Abnormal gynecological examination    Pelvic pain    Dysmenorrhea    Dyspareunia, female        RTC with Dr Corky Martinez for further discussion of possible endometriosis       Counseling:  A brief discussion of contraceptive choices and STD prevention was had.    Avoidance of cigarette smoking, alcohol use, and drug use was encouraged.    A healthy diet and regular exercise was stressed.    All questions were answered and the patient voiced understanding of the above issues.      This note was transcribed by Jael Castro. There may be transcription errors as a result, however minimal. Effort has been made to ensure accuracy of transcription, but any obvious errors or omissions should be clarified with the author of the document.

## 2024-05-27 LAB
CHLAMYDIA TRACHOMATIS: NEGATIVE
NEISSERIA GONORRHOEAE: NEGATIVE
PSYCHE PATHOLOGY RESULT: NORMAL
TRICHOMONAS VAGINALIS: NEGATIVE

## 2024-10-26 ENCOUNTER — HOSPITAL ENCOUNTER (EMERGENCY)
Facility: HOSPITAL | Age: 24
Discharge: HOME | End: 2024-10-26
Attending: INTERNAL MEDICINE
Payer: MEDICAID

## 2024-10-26 VITALS
SYSTOLIC BLOOD PRESSURE: 119 MMHG | WEIGHT: 100 LBS | DIASTOLIC BLOOD PRESSURE: 77 MMHG | HEART RATE: 95 BPM | TEMPERATURE: 97 F | RESPIRATION RATE: 15 BRPM | OXYGEN SATURATION: 99 % | BODY MASS INDEX: 16.66 KG/M2 | HEIGHT: 65 IN

## 2024-10-26 DIAGNOSIS — L03.011 HANGNAIL OF DIGIT OF RIGHT HAND: Primary | ICD-10-CM

## 2024-10-26 PROCEDURE — 25000003 PHARM REV CODE 250

## 2024-10-26 PROCEDURE — 99283 EMERGENCY DEPT VISIT LOW MDM: CPT

## 2024-10-26 RX ORDER — MUPIROCIN 20 MG/G
1 OINTMENT TOPICAL
Status: COMPLETED | OUTPATIENT
Start: 2024-10-26 | End: 2024-10-26

## 2024-10-26 RX ADMIN — MUPIROCIN 1 TUBE: 20 OINTMENT TOPICAL at 06:10

## 2025-02-19 ENCOUNTER — OFFICE VISIT (OUTPATIENT)
Dept: OBSTETRICS AND GYNECOLOGY | Facility: CLINIC | Age: 25
End: 2025-02-19
Payer: MEDICAID

## 2025-02-19 VITALS
HEIGHT: 65 IN | BODY MASS INDEX: 16.93 KG/M2 | WEIGHT: 101.63 LBS | SYSTOLIC BLOOD PRESSURE: 116 MMHG | DIASTOLIC BLOOD PRESSURE: 62 MMHG

## 2025-02-19 DIAGNOSIS — Q51.28 SEPTATE UTERUS: Primary | ICD-10-CM

## 2025-02-19 DIAGNOSIS — N94.6 DYSMENORRHEA: ICD-10-CM

## 2025-02-19 PROBLEM — G89.29 CHRONIC PELVIC PAIN IN FEMALE: Status: ACTIVE | Noted: 2025-02-19

## 2025-02-19 PROBLEM — G89.29 CHRONIC PELVIC PAIN IN FEMALE: Status: RESOLVED | Noted: 2025-02-19 | Resolved: 2025-02-19

## 2025-02-19 PROBLEM — N94.10 DYSPAREUNIA, FEMALE: Status: RESOLVED | Noted: 2025-02-19 | Resolved: 2025-02-19

## 2025-02-19 PROBLEM — N94.10 DYSPAREUNIA, FEMALE: Status: ACTIVE | Noted: 2025-02-19

## 2025-02-19 PROBLEM — R10.2 CHRONIC PELVIC PAIN IN FEMALE: Status: RESOLVED | Noted: 2025-02-19 | Resolved: 2025-02-19

## 2025-02-19 PROBLEM — R10.2 CHRONIC PELVIC PAIN IN FEMALE: Status: ACTIVE | Noted: 2025-02-19

## 2025-02-19 NOTE — PROGRESS NOTES
Chief Complaint     Multidisciplinary Discussion    HPI:     Patient is a 24 y.o.  is a patient of Lata Osorio NP presents today to discuss fertility.  Last year she reported to [facility] that she was having issues with chronic pelvic pain, pain with intercourse, painful cycles.  She says since then the pelvic pain and dyspareunia have nearly resolved.  She does also have some mild discomfort with certain positions of intercourse but is otherwise doing well.  Dysmenorrhea is mild.      Seeking pregnancy x4-5 months. Reports has not been on contraception x several years.   Patient has monthly cycles with moderate flow lasting 4 days. Her cycles are roughly 28-30 days apart.  She denies any irregular menstrual bleeding. Denies significant dysmenorrhea.  Thus far she does not have previous workup for infertility.  Patient denies vaginal discharge or itching. Denies home ovulations kits. Hx of Chlamydia for self.  Hx of SAB, 4 weeks, . Partner with no previous children/pregnancies. Denies excessive alcohol use, tobacco use. Reports marijuana use.       Gyn History:    Menstrual History  Cycle: Yes (25)  Menarche Age: 16 years  Flow Duration: 4  Flow: Normal  Interval: 28  Intermenstrual Bleeding: No  Dysmenorrhea: Yes  Dysmenorrhea Severity : Moderate    Menopause  Menopause Age: 2 years    Pap History  Last pap date: 24  Result: Normal (NIL. Neg Gc CZ TV)  History of Abnormal Pap: No  HPV Vaccine Completed: Yes  HPV Vaccine Injection Type: 3 Injection Series (3/3)    [place]  Sexually Active: Yes  Sexual Orientation: heterosexual  Postcoital Bleeding: No  Dyspareunia: Yes (certian postions are painful per pt)  STI History: Yes  STI Type: Chlamydia  Contraception: No    Breast History  Last Breast Imaging Date: No  History of Breast Biopsy: No      Per SL previous note 24:  HPI:   Peggy Noyola is a 23 y.o. year old  here for her Annual Exam. Reports concerned for  endometriosis due to family hx of. Reports severe dysmenorrhea, menorrhagia, occ dyspareunia. Dysmenorrhea relieved with NSAIDS. Pt has tried OCP in past, has not tried other forms of contraception. Reports OCP improved dysmenorrhea, menorrhagia. States will have pelvic pain at random times throughout the month    US abdomen 4/5/24:  FINDINGS:  The liver is of normal size and shape no focal lesions are seen.  There is hepatopetal flow in the portal vein.  Gallbladder is not well distended however appears anechoic.  Common bile duct measures 4 mm.  The pancreas appears normal.  The proximal IVC appears normal however flow is not demonstrated.  Aorta shows no evidence of an aneurysm however the bifurcation is not demonstrated.  The right kidney measures 9.4 by 4.1 x 4.8 cm.  There are no focal lesions noted there is no hydronephrosis.  The left kidney measures 9.6 by 3.8 x 4 cm.  There are no focal lesions noted there is no hydronephrosis  Impression:  No acute abnormalities are demonstrated    Pelvic u/s 4/5/24:  FINDINGS:  The uterus measures 8.2 x 3.3 x 4.4 cm.  The endometrial stripe is 7 mm.  There are changes suspicious for a septate uterus.  The left ovary measures 3.3 x 2.3 x 3.5 cm.  There are follicles present.  There is flow present.  The right ovary measures 3.4 x 1.5 x 2.4 cm.  There are follicles present.  There is flow present  Impression:  Changes suspicious for septate uterus    Past Medical History:   Diagnosis Date    Anxiety     Depression     Dyspareunia     Endometriosis of uterus     Hypertension     Infertility, female     Known health problems: none     Mild intermittent asthma, uncomplicated        Past Surgical History:   Procedure Laterality Date    TONSILLECTOMY         Family History   Problem Relation Name Age of Onset    Lung cancer Paternal Grandmother Marie Noyola     Cancer Paternal Grandmother Marie Noyola     Breast cancer Maternal Grandmother Emigdio Caroline         onset unknown     "Migraines Mother Rhonda Velazquez     Asthma Brother Hema Noyola     Diabetes Maternal Aunt Caryn Velazquez     Breast cancer Other MGGM         onset unknown    Uterine cancer Neg Hx      Cervical cancer Neg Hx      Ovarian cancer Neg Hx      Colon cancer Neg Hx         OB History          1    Para   0    Term   0       0    AB   1    Living   0         SAB   1    IAB   0    Ectopic   0    Multiple   0    Live Births   0           Obstetric Comments                    Medications Ordered Prior to Encounter[1]    Review of Systems:       Review of Systems   Constitutional:  Negative for chills and fever.   Gastrointestinal:  Negative for abdominal pain, constipation and diarrhea.   Genitourinary:  Negative for bladder incontinence, decreased libido, dysmenorrhea, dyspareunia, dysuria, flank pain, frequency, genital sores, hematuria, hot flashes, menorrhagia, menstrual problem, pelvic pain, urgency, vaginal bleeding, vaginal discharge, vaginal pain, urinary incontinence, postcoital bleeding, postmenopausal bleeding, vaginal dryness and vaginal odor.        Physical Exam:    /62 (BP Location: Right arm, Patient Position: Sitting)   Ht 5' 5" (1.651 m)   Wt 46.1 kg (101 lb 9.6 oz)   LMP 2025 (Exact Date)   BMI 16.91 kg/m²     Physical Exam   General Exam:    General Appearance: alert, in no acute distress, normal, well nourished.  Psych:  Orientation: time, place, person.  Mood/Affect: Normal       Assessment:   1. Septate uterus    2. Dysmenorrhea             Plan:       Pelvic pain, dyspareunia improved at this time    Reviewed available labs and imaging.    Ultrasound last year demonstrated possible septate uterus.  We discussed uterine septation, how this occurs, and potential effects on fertility and pregnancy complications including miscarriage.  Discussed hysterosalpingogram to diagnose the septum if present.  Hysterosalpingogram would also allow us to demonstrate patent fallopian " tubes.      Discussed preconception counseling with patient  Discussed importance of avoidance of tobacco, excessive alcohol use for self and partner.   Healthy diet, exercise.   Discussed need for PNV, Folic acid if trying to conceive  Discussed timed intercourse and ovulation kits in detail with patient   Ovulation kits to be used cycle day 11 until positive results or until onset of menses   Ovulation to typically occur 12-16 hours following positive ovulation test.   Discussed trying to conceive for 1-2 years prior to extensive infertility work up  Discussed possible HSG and semen analysis.   Given the ultrasound findings I believe HSG or possible hysteroscopy for evaluation of the endometrium to assess for septate uterus would be reasonable at this time.    RTC prn/annual     This note was transcribed by Jael Castro. There may be transcription errors as a result, however minimal. Effort has been made to ensure accuracy of transcription, but any obvious errors or omissions should be clarified with the author of the document.       I agree with the above documentation.            [1]   Current Outpatient Medications on File Prior to Visit   Medication Sig Dispense Refill    ARIPiprazole (ABILIFY) 10 MG Tab TAKE 1 TABLET BY MOUTH DAILY EVERY MORNING      [DISCONTINUED] sertraline (ZOLOFT) 50 MG tablet take ONE-HALF TABLET BY MOUTH EVERY MORNING FOR 5 DAYS THEN increase TO 1 tablet every morning       No current facility-administered medications on file prior to visit.

## 2025-03-10 ENCOUNTER — OFFICE VISIT (OUTPATIENT)
Dept: OBSTETRICS AND GYNECOLOGY | Facility: CLINIC | Age: 25
End: 2025-03-10
Payer: MEDICAID

## 2025-03-10 VITALS
WEIGHT: 97.63 LBS | TEMPERATURE: 98 F | HEIGHT: 65 IN | BODY MASS INDEX: 16.26 KG/M2 | SYSTOLIC BLOOD PRESSURE: 104 MMHG | DIASTOLIC BLOOD PRESSURE: 62 MMHG

## 2025-03-10 DIAGNOSIS — N92.6 MISSED MENSES: Primary | ICD-10-CM

## 2025-03-10 DIAGNOSIS — Z3A.01 LESS THAN 8 WEEKS GESTATION OF PREGNANCY: ICD-10-CM

## 2025-03-10 DIAGNOSIS — Q51.28 SEPTATE UTERUS: ICD-10-CM

## 2025-03-10 LAB
B-HCG UR QL: POSITIVE
CTP QC/QA: YES

## 2025-03-10 PROCEDURE — 3008F BODY MASS INDEX DOCD: CPT | Mod: CPTII,,, | Performed by: NURSE PRACTITIONER

## 2025-03-10 PROCEDURE — 1160F RVW MEDS BY RX/DR IN RCRD: CPT | Mod: CPTII,,, | Performed by: NURSE PRACTITIONER

## 2025-03-10 PROCEDURE — 81025 URINE PREGNANCY TEST: CPT | Mod: ,,, | Performed by: NURSE PRACTITIONER

## 2025-03-10 PROCEDURE — 3074F SYST BP LT 130 MM HG: CPT | Mod: CPTII,,, | Performed by: NURSE PRACTITIONER

## 2025-03-10 PROCEDURE — 87661 TRICHOMONAS VAGINALIS AMPLIF: CPT | Performed by: NURSE PRACTITIONER

## 2025-03-10 PROCEDURE — 1159F MED LIST DOCD IN RCRD: CPT | Mod: CPTII,,, | Performed by: NURSE PRACTITIONER

## 2025-03-10 PROCEDURE — 3078F DIAST BP <80 MM HG: CPT | Mod: CPTII,,, | Performed by: NURSE PRACTITIONER

## 2025-03-10 PROCEDURE — 99213 OFFICE O/P EST LOW 20 MIN: CPT | Mod: ,,, | Performed by: NURSE PRACTITIONER

## 2025-03-10 PROCEDURE — 87591 N.GONORRHOEAE DNA AMP PROB: CPT | Performed by: NURSE PRACTITIONER

## 2025-03-10 NOTE — PROGRESS NOTES
Chief Complaint:   LAte menses      History of Present Illness:  Peggy Noyola is a 24 y.o. year old  presents c/o missed cycle. Patient's last menstrual period was 2025 (exact date).. Prior to this, having regular monthly cycles. Sexually active. Denies vaginal bleeding, discharge or pelvic pain.   Hx of uterine septum.  No PNV    Gyn History:  Menstrual History  Cycle: Yes  Menarche Age: 16 years  Flow: Normal  Intermenstrual Bleeding: No  Dysmenorrhea: No    Menopause  Menopause Age: 2 years    Pap History  Last pap date: 24  Result: Normal (NIL. Neg Gc CZ TV)  History of Abnormal Pap: No  HPV Vaccine Completed: Yes  HPV Vaccine Injection Type: 3 Injection Series    [place]  Sexually Active: Yes  Sexual Orientation: heterosexual  Postcoital Bleeding: No  Dyspareunia: No  STI History: Yes  STI Type: Chlamydia  Contraception: No    Breast History           Review of Systems:  General/Constitutional: Chills denies. Fatigue/weakness denies. Fever denies. Night sweats denies. Hot flashes denies.  Gastrointestinal: Abdominal pain denies. Blood in stool denies. Constipation denies. Diarrhea denies. Heartburn denies. Nausea denies. Vomiting denies.   Genitourinary: Incontinence denies. Blood in urine denies. Frequent urination denies. Urgency denies. Painful urination denies. Nocturia denies.  Gynecologic: Irregular menses denies. Heavy bleeding denies. Painful menses denies. Vaginal discharge denies. Vaginal odor denies. Vaginal itching/Irritation denies. Vaginal lesion denies.  Pelvic pain denies. Decreased libido denies. Vulvar lesion denies. Prolapse of genital organs denies. Painful intercourse denies. Postcoital bleeding denies.   Psychiatric: Mood lability denies. Depressed mood denies. Suicidal thoughts denies. Anxiety denies. Overwhelmed denies. Appetite normal. Energy level normal.    OB History    Para Term  AB Living   1 0 0 0 1 0   SAB IAB Ectopic Multiple Live  "Births   1 0 0 0 0   Obstetric Comments         # 1 - Date: 20, Sex: None, Weight: None, GA: 4w0d, Type: Spontaneous , Apgar1: None, Apgar5: None, Living: None, Birth Comments: None      Past Medical History:   Diagnosis Date    Anxiety     Depression     Dyspareunia     Endometriosis of uterus     Hypertension     Infertility, female     Known health problems: none     Mild intermittent asthma, uncomplicated        Past Surgical History:   Procedure Laterality Date    TONSILLECTOMY          Current Medications[1]    Social History[2]      Physical Exam:  /62   Temp 97.7 °F (36.5 °C)   Ht 5' 5" (1.651 m)   Wt 44.3 kg (97 lb 9.6 oz)   LMP 2025 (Exact Date)   BMI 16.24 kg/m²     Chaperone present.       Constitutional: General appearance: healthy, well-nourished and well-developed  Psychiatric:  Orientation to time, place and person. Normal mood and affect and active, alert   Abdomen: Auscultation/Inspection/Palpation: No tenderness or masses. Soft, nondistended      Female Genitalia:      Vulva: no masses, atrophy or lesions      Bladder/Urethra: no urethral discharge or mass, normal meatus, bladder non-distended.      Vagina: no tenderness, erythema, cystocele, rectocele, abnormal vaginal discharge, or vesicle(s) or ulcers                   Cervix: no discharge or cervical motion tenderness and grossly normal      Uterus: normal size and shape and midline, non-tender, and no uterine prolapse.      Adnexa/Parametria: no parametrial tenderness or mass, no adnexal tenderness or ovarian mass.       Assessment/Plan:  1. Missed menses  -     POCT urine pregnancy  -     PNV 11-iron fum-folic acid-om3 28 mg iron-1 mg -200 mg Cap; Take 1 tablet by mouth once daily.  Dispense: 30 each; Refill: 11    2. Less than 8 weeks gestation of pregnancy  -     PNV 11-iron fum-folic acid-om3 28 mg iron-1 mg -200 mg Cap; Take 1 tablet by mouth once daily.  Dispense: 30 each; Refill: 11    3. Septate " uterus       UPT+   Educated   No AMELIA   PNV, folic acid  Cultures: gc/cz/tv  Pain/fever/bleeding prec     Based on Patient's last menstrual period was 01/28/2025 (exact date). , at 5 6/7 weeks with NBA of 11/4/25  Follow up new ob visit in 2 weeks with Dr Martinez           [1]   Current Outpatient Medications:     PNV 11-iron fum-folic acid-om3 28 mg iron-1 mg -200 mg Cap, Take 1 tablet by mouth once daily., Disp: 30 each, Rfl: 11  [2]   Social History  Socioeconomic History    Marital status: Single   Tobacco Use    Smoking status: Never     Passive exposure: Never    Smokeless tobacco: Never   Substance and Sexual Activity    Alcohol use: Not Currently     Comment: twice monthly    Drug use: Yes     Frequency: 6.0 times per week     Types: Marijuana    Sexual activity: Yes     Partners: Male     Birth control/protection: None     Comment: Seeking pregnancy

## 2025-03-13 ENCOUNTER — RESULTS FOLLOW-UP (OUTPATIENT)
Dept: OBSTETRICS AND GYNECOLOGY | Facility: CLINIC | Age: 25
End: 2025-03-13

## 2025-03-13 ENCOUNTER — TELEPHONE (OUTPATIENT)
Dept: OBSTETRICS AND GYNECOLOGY | Facility: CLINIC | Age: 25
End: 2025-03-13
Payer: MEDICAID

## 2025-03-13 NOTE — PROGRESS NOTES
Chief Complaint:  Initial Prenatal Visit (LMP: 25 . Pt is here for Initial Ob appt. )    History of Present Illness:  Peggy Noyola is a 24 y.o. year old  presents for her new ob, 7w6d by LMP. Patient's last menstrual period was 2025 (exact date).    Denies hx of genetic disorders for self, FOB    Previous US reported intrauterine changes suspicious for uterine septum.     Gyn History:    Menstrual History  Cycle: Yes (LMP:25)  Menarche Age: 16 years  Flow Duration: 5  Flow: Normal  Interval: 28  Intermenstrual Bleeding: No  Dysmenorrhea: Yes  Dysmenorrhea Severity : Moderate    Menopause  Menopause Age: 2 years    Pap History  Last pap date: 24  Result: (!) Abnormal (NIL NEG. GC CZ TV)  History of Abnormal Pap: No  HPV Vaccine Completed: Yes (3/3)  HPV Vaccine Injection Type: 3 Injection Series    Sl[place]  Sexually Active: Yes  Sexual Orientation: heterosexual  Postcoital Bleeding: No  Dyspareunia: No  STI History: Yes  STI Type: Chlamydia  Contraception: No    Breast History  Last Breast Imaging Date: No  History of Breast Biopsy: No          Review of Systems:  General/Constitutional: Chills denies. Fatigue/weakness denies. Fever denies. Night sweats denies. Hot flashes denies.   Respiratory: Cough denies. Hemoptysis denies. SOB denies. Sputum production denies. Wheezing denies.   Cardiovascular: Chest pain denies. Dizziness denies. Palpitations denies. Swelling in hands/feet denies.    Gastrointestinal: Abdominal pain denies. Blood in stool denies. Constipation denies. Diarrhea denies. Heartburn denies. Nausea denies. Vomiting denies.   Genitourinary: Incontinence denies. Blood in urine denies. Frequent urination denies. Painful urination denies.  Urinary urgency denies.  Nocturia denies.   Gynecologic: Irregular menses denies. Heavy bleeding denies. Painful menses denies. Vaginal discharge denies. Vaginal odor denies. Vaginal itching denies. Vaginal lesion denies.  Pelvic  pain denies. Decreased libido denies. Vulvar lesion denies. Prolapse of genital organs denies. Painful intercourse denies. Postcoital bleeding denies.   Psychiatric: Depression denies. Anxiety denies.     OB History    Para Term  AB Living   2 0 0 0 1 0   SAB IAB Ectopic Multiple Live Births   1 0 0 0 0   Obstetric Comments         # 1 - Date: 20, Sex: None, Weight: None, GA: 4w0d, Type: Spontaneous , Apgar1: None, Apgar5: None, Living: None, Birth Comments: None    # 2 - Date: None, Sex: None, Weight: None, GA: None, Type: None, Apgar1: None, Apgar5: None, Living: None, Birth Comments: None        Past Medical History:   Diagnosis Date    Anxiety     Depression     Dyspareunia     Endometriosis of uterus     Hypertension     Infertility, female     Known health problems: none     Mild intermittent asthma, uncomplicated      Current Medications[1]    Review of patient's allergies indicates:   Allergen Reactions    Cat dander Itching       Past Surgical History:   Procedure Laterality Date    TONSILLECTOMY       Family History   Problem Relation Name Age of Onset    Lung cancer Paternal Grandmother Marie Noyola     Cancer Paternal Grandmother Marie Noyola     Breast cancer Maternal Grandmother Maw Caroline         onset unknown    Migraines Mother Rhonda Velazquez     Asthma Brother Hema Noyola     Diabetes Maternal Aunt Caryn Velazquez     Breast cancer Other MGGM         onset unknown    Uterine cancer Neg Hx      Cervical cancer Neg Hx      Ovarian cancer Neg Hx      Colon cancer Neg Hx       Social History[2]    Physical Exam:  BP (!) 92/58   Wt 44 kg (97 lb)   LMP 2025 (Exact Date)   BMI 16.14 kg/m²     General Exam:    General Appearance: alert, in no acute distress, normal, well nourished.  Psych:  Orientation: time, place, person.  Mood/Affect: Normal   Abdomen:  - Soft. Non-tender. No rebound tenderness or guardingNo masses. Liver normal. Spleen normal. No hernia  palpable.  Pelvis:   - Vulva: Normal   - Perianal skin: Normal   - Urethra: Normal meatus. Q-tip: Not performed   - Vagina: NormalVaginal discharge present: . Cystocele: Absent. Rectocele: Absent   - Cervix: Normal. Cervical motion tenderness Absent   - Uterus. Mobile. Non-tender.   - Adnexal: Normal      TVUS:  No evidence of uterine septum today on exam   IUP W CRL: 8w3d  NBA: 11/4/25 by LMP c/w Todays us  FHT:170s        Assessment/Plan:  1. Encounter for supervision of other normal pregnancy in first trimester    2. 7 weeks gestation of pregnancy  -     Rubella Antibody, IgG; Future; Expected date: 03/24/2025  -     Varicella Zoster Antibody, IgG; Future; Expected date: 03/24/2025  -     HIV 1/2 Ag/Ab (4th Gen); Future; Expected date: 03/24/2025  -     SYPHILIS ANTIBODY (WITH REFLEX RPR); Future; Expected date: 03/24/2025  -     Hepatitis C Antibody; Future; Expected date: 03/24/2025  -     Hepatitis B Surface Antigen; Future; Expected date: 03/24/2025  -     Type & Screen; Future; Expected date: 03/24/2025  -     CBC Auto Differential; Future; Expected date: 03/24/2025    3. Positive pregnancy test  -     US OB/GYN Procedure ([facility]) - Extended List; Future; Expected date: 03/24/2025  -     POCT Urinalysis, Dipstick, Automated, W/O Scope  -     Urine Culture High Risk  -     Trichomonas vaginalis, RNA, Qual, Urine  -     C.trach/N.gonor AMP RNA  -     Rubella Antibody, IgG; Future; Expected date: 03/24/2025  -     Varicella Zoster Antibody, IgG; Future; Expected date: 03/24/2025  -     HIV 1/2 Ag/Ab (4th Gen); Future; Expected date: 03/24/2025  -     SYPHILIS ANTIBODY (WITH REFLEX RPR); Future; Expected date: 03/24/2025  -     Hepatitis C Antibody; Future; Expected date: 03/24/2025  -     Hepatitis B Surface Antigen; Future; Expected date: 03/24/2025  -     Type & Screen; Future; Expected date: 03/24/2025  -     CBC Auto Differential; Future; Expected date: 03/24/2025    4. Uterine septum  -     Rubella  Antibody, IgG; Future; Expected date: 03/24/2025  -     Varicella Zoster Antibody, IgG; Future; Expected date: 03/24/2025  -     HIV 1/2 Ag/Ab (4th Gen); Future; Expected date: 03/24/2025  -     SYPHILIS ANTIBODY (WITH REFLEX RPR); Future; Expected date: 03/24/2025  -     Hepatitis C Antibody; Future; Expected date: 03/24/2025  -     Hepatitis B Surface Antigen; Future; Expected date: 03/24/2025  -     Type & Screen; Future; Expected date: 03/24/2025  -     CBC Auto Differential; Future; Expected date: 03/24/2025         Prenatal counseling  Discussed appropriate weight gain for pregnancy  Tobacco avoidance/cessation  Illicit drug avoidance  PNL  PNV, folic acid     GC/CZ/TV urine     Radiologist documented evidence of uterine septum on previous ultrasound. No evidence of uterine septum today on exam     Appropriate calorie intake, healthy diet     RTC 4 weeks OB check       This note was transcribed by Jael Castro. There may be transcription errors as a result, however minimal. Effort has been made to ensure accuracy of transcription, but any obvious errors or omissions should be clarified with the author of the document.       I agree with the above documentation.                [1]   Current Outpatient Medications:     PNV 11-iron fum-folic acid-om3 28 mg iron-1 mg -200 mg Cap, Take 1 tablet by mouth once daily., Disp: 30 each, Rfl: 11  [2]   Social History  Socioeconomic History    Marital status: Single   Tobacco Use    Smoking status: Never     Passive exposure: Never    Smokeless tobacco: Never   Substance and Sexual Activity    Alcohol use: Not Currently     Comment: twice monthly    Drug use: Yes     Frequency: 6.0 times per week     Types: Marijuana    Sexual activity: Yes     Partners: Male     Birth control/protection: None     Comment: Seeking pregnancy

## 2025-03-13 NOTE — TELEPHONE ENCOUNTER
----- Message from Lata Osorio NP sent at 3/13/2025  2:35 PM CDT -----  Regarding: FW: Call Back  See if she has taken Unisom 1/2 tab and B6 - if she has tried, may give Zofran if unable to tolerate 50% of intake.  ----- Message -----  From: Maryann Campbell LPN  Sent: 3/13/2025   2:26 PM CDT  To: Lata Osorio NP  Subject: FW: Call Back                                      ----- Message -----  From: Kaycee Turk  Sent: 3/13/2025   1:41 PM CDT  To: [facility] Staff  Subject: Call Back                                        Type:  Patient Returning CallWho Called:Who Left Message for Patient:Does the patient know what this is regarding?:Would the patient rather a call back or a response via [facility]? Best Call Back Number:[phone] Information: Asking for Zofran. New OB 3/24/2025. States she was offered at appointment. Pharmacy:[facility]

## 2025-03-13 NOTE — TELEPHONE ENCOUNTER
"Phoned patient, educated per SL recommendations. Verbalized understanding. Sts "I am able to hold down at least 50% of my food".   "

## 2025-03-24 ENCOUNTER — INITIAL PRENATAL (OUTPATIENT)
Dept: OBSTETRICS AND GYNECOLOGY | Facility: CLINIC | Age: 25
End: 2025-03-24
Payer: MEDICAID

## 2025-03-24 VITALS — BODY MASS INDEX: 16.14 KG/M2 | DIASTOLIC BLOOD PRESSURE: 58 MMHG | WEIGHT: 97 LBS | SYSTOLIC BLOOD PRESSURE: 92 MMHG

## 2025-03-24 DIAGNOSIS — Z3A.01 7 WEEKS GESTATION OF PREGNANCY: ICD-10-CM

## 2025-03-24 DIAGNOSIS — Z32.01 POSITIVE PREGNANCY TEST: ICD-10-CM

## 2025-03-24 DIAGNOSIS — Z34.81 ENCOUNTER FOR SUPERVISION OF OTHER NORMAL PREGNANCY IN FIRST TRIMESTER: Primary | ICD-10-CM

## 2025-03-24 DIAGNOSIS — Q51.28 UTERINE SEPTUM: ICD-10-CM

## 2025-03-24 PROCEDURE — 87086 URINE CULTURE/COLONY COUNT: CPT | Performed by: OBSTETRICS & GYNECOLOGY

## 2025-03-24 PROCEDURE — 87491 CHLMYD TRACH DNA AMP PROBE: CPT | Performed by: OBSTETRICS & GYNECOLOGY

## 2025-03-26 LAB
BILIRUB UR QL STRIP: NEGATIVE
C TRACH RRNA SPEC QL NAA+PROBE: NEGATIVE
GLUCOSE UR QL STRIP: NEGATIVE
KETONES UR QL STRIP: NEGATIVE
LEUKOCYTE ESTERASE UR QL STRIP: NEGATIVE
N GONORRHOEA RRNA SPEC QL NAA+PROBE: NEGATIVE
PH, POC UA: 6.5
POC BLOOD, URINE: NEGATIVE
POC NITRATES, URINE: NEGATIVE
PROT UR QL STRIP: NEGATIVE
SP GR UR STRIP: 1.02 (ref 1–1.03)
SPECIMEN SOURCE: NORMAL
SPECIMEN SOURCE: NORMAL
UROBILINOGEN UR STRIP-ACNC: 0.2 (ref 0.1–1.1)

## 2025-03-28 LAB — BACTERIA UR CULT: NO GROWTH

## 2025-04-11 ENCOUNTER — LAB VISIT (OUTPATIENT)
Dept: LAB | Facility: HOSPITAL | Age: 25
End: 2025-04-11
Attending: OBSTETRICS & GYNECOLOGY
Payer: MEDICAID

## 2025-04-11 DIAGNOSIS — Z32.01 POSITIVE PREGNANCY TEST: ICD-10-CM

## 2025-04-11 DIAGNOSIS — Z3A.01 7 WEEKS GESTATION OF PREGNANCY: ICD-10-CM

## 2025-04-11 DIAGNOSIS — Q51.28 UTERINE SEPTUM: ICD-10-CM

## 2025-04-11 LAB
BASOPHILS # BLD AUTO: 0.02 X10(3)/MCL (ref 0.01–0.08)
BASOPHILS NFR BLD AUTO: 0.2 % (ref 0.1–1.2)
EOSINOPHIL # BLD AUTO: 0.05 X10(3)/MCL (ref 0.04–0.36)
EOSINOPHIL NFR BLD AUTO: 0.4 % (ref 0.7–7)
ERYTHROCYTE [DISTWIDTH] IN BLOOD BY AUTOMATED COUNT: 13.5 % (ref 11–14.5)
GROUP & RH: NORMAL
HBV SURFACE AG SERPL QL IA: NEGATIVE
HBV SURFACE AG SERPL QL IA: NORMAL
HCT VFR BLD AUTO: 44.9 % (ref 36–48)
HCV AB SERPL QL IA: NONREACTIVE
HGB BLD-MCNC: 14.7 G/DL (ref 11.8–16)
HIV 1+2 AB+HIV1 P24 AG SERPL QL IA: NONREACTIVE
IMM GRANULOCYTES # BLD AUTO: 0.06 X10(3)/MCL (ref 0–0.03)
IMM GRANULOCYTES NFR BLD AUTO: 0.5 % (ref 0–0.5)
INDIRECT COOMBS: NORMAL
LYMPHOCYTES # BLD AUTO: 1.94 X10(3)/MCL (ref 1.16–3.74)
LYMPHOCYTES NFR BLD AUTO: 16.3 % (ref 20–55)
MCH RBC QN AUTO: 30.6 PG (ref 27–34)
MCHC RBC AUTO-ENTMCNC: 32.7 G/DL (ref 31–37)
MCV RBC AUTO: 93.3 FL (ref 79–99)
MONOCYTES # BLD AUTO: 0.64 X10(3)/MCL (ref 0.24–0.36)
MONOCYTES NFR BLD AUTO: 5.4 % (ref 4.7–12.5)
NEUTROPHILS # BLD AUTO: 9.2 X10(3)/MCL (ref 1.56–6.13)
NEUTROPHILS NFR BLD AUTO: 77.2 % (ref 37–73)
NRBC BLD AUTO-RTO: 0 %
PLATELET # BLD AUTO: 326 X10(3)/MCL (ref 140–371)
PMV BLD AUTO: 9.4 FL (ref 9.4–12.4)
RBC # BLD AUTO: 4.81 X10(6)/MCL (ref 4–5.1)
RUBELLA AB, IGG (OLG): 59.6 IU/ML
SPECIMEN OUTDATE: NORMAL
T PALLIDUM AB SER QL: NONREACTIVE
WBC # BLD AUTO: 11.91 X10(3)/MCL (ref 4–11.5)

## 2025-04-11 PROCEDURE — 86900 BLOOD TYPING SEROLOGIC ABO: CPT | Performed by: OBSTETRICS & GYNECOLOGY

## 2025-04-11 PROCEDURE — 86762 RUBELLA ANTIBODY: CPT

## 2025-04-11 PROCEDURE — 87340 HEPATITIS B SURFACE AG IA: CPT

## 2025-04-11 PROCEDURE — 86787 VARICELLA-ZOSTER ANTIBODY: CPT

## 2025-04-11 PROCEDURE — 36415 COLL VENOUS BLD VENIPUNCTURE: CPT

## 2025-04-11 PROCEDURE — 87389 HIV-1 AG W/HIV-1&-2 AB AG IA: CPT

## 2025-04-11 PROCEDURE — 86780 TREPONEMA PALLIDUM: CPT

## 2025-04-11 PROCEDURE — 85025 COMPLETE CBC W/AUTO DIFF WBC: CPT

## 2025-04-11 PROCEDURE — 86803 HEPATITIS C AB TEST: CPT

## 2025-04-12 LAB
VZV IGG SER IA-ACNC: 0.9
VZV IGG SER QL IA: NORMAL

## 2025-04-21 ENCOUNTER — ROUTINE PRENATAL (OUTPATIENT)
Dept: OBSTETRICS AND GYNECOLOGY | Facility: CLINIC | Age: 25
End: 2025-04-21
Payer: MEDICAID

## 2025-04-21 VITALS — BODY MASS INDEX: 17.47 KG/M2 | SYSTOLIC BLOOD PRESSURE: 120 MMHG | WEIGHT: 105 LBS | DIASTOLIC BLOOD PRESSURE: 64 MMHG

## 2025-04-21 DIAGNOSIS — Z28.39 MATERNAL VARICELLA, NON-IMMUNE: Primary | ICD-10-CM

## 2025-04-21 DIAGNOSIS — Z3A.11 11 WEEKS GESTATION OF PREGNANCY: ICD-10-CM

## 2025-04-21 DIAGNOSIS — O09.899 MATERNAL VARICELLA, NON-IMMUNE: Primary | ICD-10-CM

## 2025-04-21 LAB
BILIRUB UR QL STRIP: NORMAL
GLUCOSE UR QL STRIP: NEGATIVE
KETONES UR QL STRIP: NORMAL
LEUKOCYTE ESTERASE UR QL STRIP: NEGATIVE
PH, POC UA: NORMAL
POC BLOOD, URINE: NORMAL
POC NITRATES, URINE: NEGATIVE
PROT UR QL STRIP: NEGATIVE
SP GR UR STRIP: NORMAL (ref 1–1.03)
UROBILINOGEN UR STRIP-ACNC: NORMAL (ref 0.3–2.2)

## 2025-04-21 PROCEDURE — 99214 OFFICE O/P EST MOD 30 MIN: CPT | Mod: TH,,, | Performed by: OBSTETRICS & GYNECOLOGY

## 2025-04-21 NOTE — PROGRESS NOTES
HPI  24 y.o.  at 11w6d  here for OB check.        ROS  Review of Systems   Constitutional:  Negative for chills and fever.   Gastrointestinal:  Negative for abdominal pain, constipation and diarrhea.   Genitourinary:  Negative for flank pain, genital sores, pelvic pain, urgency, vaginal bleeding, vaginal discharge, vaginal pain, postcoital bleeding and vaginal odor.         OBJECTIVE  /64   Wt 47.6 kg (105 lb)   LMP 2025 (Exact Date)   BMI 17.47 kg/m²     Gen: No distress  Abdomen: Gravid, non-tender  Extremities: No edema    FHT: 160      ASSESSMENT  1. Maternal varicella, non-immune    2. 11 weeks gestation of pregnancy  - POCT Urinalysis, Dipstick, Automated, W/O Scope        PLAN  Reviewed standard labor unit and kick count precautions.  Discussed pre-eclampsia precautions.  Discussed COVID-19 risks, social distancing, and isolation if respiratory symptoms develop.     Labs nml  CFDNA nml  Varicella: equivocal  Recommend avoiding sick contacts with chicken pox or shingles  Notify us if contact occurs      RTC 4 weeks
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
## 2025-05-19 ENCOUNTER — ROUTINE PRENATAL (OUTPATIENT)
Dept: OBSTETRICS AND GYNECOLOGY | Facility: CLINIC | Age: 25
End: 2025-05-19
Payer: MEDICAID

## 2025-05-19 VITALS — BODY MASS INDEX: 18.14 KG/M2 | SYSTOLIC BLOOD PRESSURE: 102 MMHG | DIASTOLIC BLOOD PRESSURE: 68 MMHG | WEIGHT: 109 LBS

## 2025-05-19 DIAGNOSIS — Z3A.15 15 WEEKS GESTATION OF PREGNANCY: ICD-10-CM

## 2025-05-19 DIAGNOSIS — Z28.39 MATERNAL VARICELLA, NON-IMMUNE: Primary | ICD-10-CM

## 2025-05-19 DIAGNOSIS — O09.899 MATERNAL VARICELLA, NON-IMMUNE: Primary | ICD-10-CM

## 2025-05-19 DIAGNOSIS — T74.91XD DOMESTIC VIOLENCE OF ADULT, SUBSEQUENT ENCOUNTER: ICD-10-CM

## 2025-05-20 ENCOUNTER — PATIENT MESSAGE (OUTPATIENT)
Dept: OTHER | Facility: OTHER | Age: 25
End: 2025-05-20
Payer: MEDICAID

## 2025-06-08 ENCOUNTER — HOSPITAL ENCOUNTER (EMERGENCY)
Facility: HOSPITAL | Age: 25
Discharge: HOME | End: 2025-06-08
Attending: INTERNAL MEDICINE
Payer: MEDICAID

## 2025-06-08 VITALS
SYSTOLIC BLOOD PRESSURE: 135 MMHG | WEIGHT: 114.81 LBS | DIASTOLIC BLOOD PRESSURE: 80 MMHG | TEMPERATURE: 98 F | RESPIRATION RATE: 16 BRPM | BODY MASS INDEX: 19.1 KG/M2 | HEART RATE: 91 BPM | OXYGEN SATURATION: 100 %

## 2025-06-08 DIAGNOSIS — T30.0 FIRST DEGREE BURN: Primary | ICD-10-CM

## 2025-06-08 PROCEDURE — 99283 EMERGENCY DEPT VISIT LOW MDM: CPT

## 2025-06-08 RX ORDER — SILVER SULFADIAZINE 10 G/1000G
CREAM TOPICAL 2 TIMES DAILY
Qty: 20 G | Refills: 0 | Status: SHIPPED | OUTPATIENT
Start: 2025-06-08 | End: 2025-06-13
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
## 2025-06-17 ENCOUNTER — PATIENT MESSAGE (OUTPATIENT)
Dept: OTHER | Facility: OTHER | Age: 25
End: 2025-06-17
Payer: MEDICAID

## 2025-07-15 ENCOUNTER — PATIENT MESSAGE (OUTPATIENT)
Dept: OTHER | Facility: OTHER | Age: 25
End: 2025-07-15
Payer: MEDICAID

## 2025-07-29 ENCOUNTER — PATIENT MESSAGE (OUTPATIENT)
Dept: OTHER | Facility: OTHER | Age: 25
End: 2025-07-29
Payer: MEDICAID

## 2025-08-12 ENCOUNTER — PATIENT MESSAGE (OUTPATIENT)
Dept: OTHER | Facility: OTHER | Age: 25
End: 2025-08-12
Payer: MEDICAID

## 2025-08-26 ENCOUNTER — PATIENT MESSAGE (OUTPATIENT)
Dept: OTHER | Facility: OTHER | Age: 25
End: 2025-08-26
Payer: MEDICAID